# Patient Record
Sex: MALE
[De-identification: names, ages, dates, MRNs, and addresses within clinical notes are randomized per-mention and may not be internally consistent; named-entity substitution may affect disease eponyms.]

---

## 2023-07-26 ENCOUNTER — DASHBOARD ENCOUNTERS (OUTPATIENT)
Age: 33
End: 2023-07-26

## 2023-07-26 ENCOUNTER — OFFICE VISIT (OUTPATIENT)
Dept: URBAN - METROPOLITAN AREA CLINIC 92 | Facility: CLINIC | Age: 33
End: 2023-07-26
Payer: COMMERCIAL

## 2023-07-26 VITALS
BODY MASS INDEX: 21.53 KG/M2 | HEIGHT: 66 IN | WEIGHT: 134 LBS | HEART RATE: 74 BPM | DIASTOLIC BLOOD PRESSURE: 73 MMHG | TEMPERATURE: 97.8 F | SYSTOLIC BLOOD PRESSURE: 113 MMHG

## 2023-07-26 DIAGNOSIS — K62.89 RECTAL DISCOMFORT: ICD-10-CM

## 2023-07-26 DIAGNOSIS — R10.13 EPIGASTRIC ABDOMINAL PAIN: ICD-10-CM

## 2023-07-26 DIAGNOSIS — K59.01 CONSTIPATION: ICD-10-CM

## 2023-07-26 DIAGNOSIS — K21.9 ACID REFLUX: ICD-10-CM

## 2023-07-26 PROBLEM — 14760008: Status: ACTIVE | Noted: 2023-07-26

## 2023-07-26 PROBLEM — 79922009: Status: ACTIVE | Noted: 2023-07-26

## 2023-07-26 PROBLEM — 235595009: Status: ACTIVE | Noted: 2023-07-26

## 2023-07-26 PROCEDURE — 99204 OFFICE O/P NEW MOD 45 MIN: CPT | Performed by: INTERNAL MEDICINE

## 2023-07-26 RX ORDER — OMEPRAZOLE 40 MG/1
1 CAPSULE 30 MINUTES BEFORE MORNING MEAL CAPSULE, DELAYED RELEASE ORAL ONCE A DAY
Qty: 90 | Refills: 3 | OUTPATIENT
Start: 2023-07-26

## 2023-07-26 RX ORDER — ALPRAZOLAM 1 MG/1
1 TABLET TABLET ORAL TWICE A DAY
Status: ACTIVE | COMMUNITY

## 2023-07-26 RX ORDER — ELVITEGRAVIR, COBICISTAT, EMTRICITABINE, AND TENOFOVIR ALAFENAMIDE 150; 150; 200; 10 MG/1; MG/1; MG/1; MG/1
AS DIRECTED TABLET ORAL
Status: ACTIVE | COMMUNITY

## 2023-07-26 NOTE — HPI-TODAY'S VISIT:
Patient is a 33 year old female who presents for abdominal pain and GERD.  The patient reports he developed rectal discomfort 5 days ago followed by lower abdominal pain 3 days ago. Patient notes he went to the urgent care yesterday due to his pain and was prescribed lactulose and a CT scan was ordered, pt still waiting to hear back.  Pain started in his LLQ and moved to the RLQ Pain which made it difficult to sleep but patient reports symptoms have improved. Never had this pain before. Still notes of mild rectal discomfort, no sharp shooting pain. Typically, patient can go 2-3 days without a BM and admits to incomplete BM. Patient notes he was able to have a BM after taking lactulose but was pellet like, has only taken one dose. Denies hematochezia, melena, diarrhea. Patient admits to family history of colon cancer via paternal grandfather. Paternal aunt had history of pancreatic cancer. In 2016, patient had an EGD due to terrible reflux. Procedure revealed a HH and has been taking omeprazole OTC since then with benefit. Avoids sauces, sodas, and other triggering foods. Patient sleeps with head elevated but is gradually able to sleep less inclined without issues. Occasionally, has epigastric pain even on omeprazole and admits to acid reflux if he misses a dose.  Patient has blood work done every 3 months. HIV levels are undetectable. Currenty, on Genvoya.

## 2023-07-26 NOTE — PHYSICAL EXAM GASTROINTESTINAL
Abdomen , soft, nontender, nondistended , no guarding or rigidity , no masses palpable , normal bowel sounds Liver and Spleen , no hepatosplenomegaly Rectal Exam: No external abnormalities, mild discomfort posteriorly, no IH, no blood on examining finger, no fissure visualized. Chaperone present

## 2023-08-14 ENCOUNTER — WEB ENCOUNTER (OUTPATIENT)
Dept: URBAN - METROPOLITAN AREA CLINIC 92 | Facility: CLINIC | Age: 33
End: 2023-08-14

## 2023-08-16 ENCOUNTER — WEB ENCOUNTER (OUTPATIENT)
Dept: URBAN - METROPOLITAN AREA CLINIC 92 | Facility: CLINIC | Age: 33
End: 2023-08-16

## 2023-08-18 ENCOUNTER — OFFICE VISIT (OUTPATIENT)
Dept: URBAN - METROPOLITAN AREA SURGERY CENTER 16 | Facility: SURGERY CENTER | Age: 33
End: 2023-08-18

## 2023-09-14 ENCOUNTER — OFFICE VISIT (OUTPATIENT)
Dept: URBAN - METROPOLITAN AREA CLINIC 92 | Facility: CLINIC | Age: 33
End: 2023-09-14

## 2023-09-14 NOTE — HPI-TODAY'S VISIT:
reece is a 33 year old female who presents for abdominal pain and GERD.        The patient reports he developed rectal discomfort 5 days ago followed by lower abdominal pain 3 days ago. Patient notes he went to the urgent care yesterday due to his pain and was prescribed lactulose and a CT scan was ordered, pt still waiting to hear back. Pain started in his LLQ and moved to the RLQ Pain which made it difficult to sleep but patient reports symptoms have improved. Never had this pain before. Still notes of mild rectal discomfort, no sharp shooting pain. Typically, patient can go 2-3 days without a BM and admits to incomplete BM. Patient notes he was able to have a BM after taking lactulose but was pellet like, has only taken one dose. Denies hematochezia, melena, diarrhea. Patient admits to family history of colon cancer via paternal grandfather. Paternal aunt had history of pancreatic cancer.        In 2016, patient had an EGD due to terrible reflux. Procedure revealed a HH and has been taking omeprazole OTC since then with benefit. Avoids sauces, sodas, and other triggering foods. Patient sleeps with head elevated but is gradually able to sleep less inclined without issues. Occasionally, has epigastric pain even on omeprazole and admits to acid reflux if he misses a dose.        Patient has blood work done every 3 months. HIV levels are undetectable. Currenty, on Genvoya.

## 2024-08-05 ENCOUNTER — WEB ENCOUNTER (OUTPATIENT)
Dept: URBAN - METROPOLITAN AREA CLINIC 15 | Facility: CLINIC | Age: 34
End: 2024-08-05

## 2024-08-05 RX ORDER — OMEPRAZOLE 40 MG/1
1 CAPSULE 30 MINUTES BEFORE MORNING MEAL CAPSULE, DELAYED RELEASE ORAL ONCE A DAY
Qty: 30 | Refills: 0
Start: 2023-07-26

## 2024-08-08 ENCOUNTER — P2P PATIENT RECORD (OUTPATIENT)
Age: 34
End: 2024-08-08

## 2024-08-26 ENCOUNTER — OFFICE VISIT (OUTPATIENT)
Dept: URBAN - METROPOLITAN AREA CLINIC 92 | Facility: CLINIC | Age: 34
End: 2024-08-26

## 2024-08-26 RX ORDER — ELVITEGRAVIR, COBICISTAT, EMTRICITABINE, AND TENOFOVIR ALAFENAMIDE 150; 150; 200; 10 MG/1; MG/1; MG/1; MG/1
AS DIRECTED TABLET ORAL
COMMUNITY

## 2024-08-26 RX ORDER — ALPRAZOLAM 1 MG/1
1 TABLET TABLET ORAL TWICE A DAY
COMMUNITY

## 2024-08-26 RX ORDER — OMEPRAZOLE 40 MG/1
1 CAPSULE 30 MINUTES BEFORE MORNING MEAL CAPSULE, DELAYED RELEASE ORAL ONCE A DAY
Qty: 30 | Refills: 0 | COMMUNITY
Start: 2023-07-26

## 2024-08-26 NOTE — HPI-TODAY'S VISIT:
Patient is a 33 year old male who presents for abdominal pain and GERD.         The patient reports he developed rectal discomfort 5 days ago followed by lower abdominal pain 3 days ago. Patient notes he went to the urgent care yesterday due to his pain and was prescribed lactulose and a CT scan was ordered, pt still waiting to hear back. Pain started in his LLQ and moved to the RLQ Pain which made it difficult to sleep but patient reports symptoms have improved. Never had this pain before. Still notes of mild rectal discomfort, no sharp shooting pain. Typically, patient can go 2-3 days without a BM and admits to incomplete BM. Patient notes he was able to have a BM after taking lactulose but was pellet like, has only taken one dose. Denies hematochezia, melena, diarrhea. Patient admits to family history of colon cancer via paternal grandfather. Paternal aunt had history of pancreatic cancer.        In 2016, patient had an EGD due to terrible reflux. Procedure revealed a HH and has been taking omeprazole OTC since then with benefit. Avoids sauces, sodas, and other triggering foods. Patient sleeps with head elevated but is gradually able to sleep less inclined without issues. Occasionally, has epigastric pain even on omeprazole and admits to acid reflux if he misses a dose.        Patient has blood work done every 3 months. HIV levels are undetectable. Currenty, on Genvoya.

## 2024-09-09 ENCOUNTER — OFFICE VISIT (OUTPATIENT)
Dept: URBAN - METROPOLITAN AREA CLINIC 92 | Facility: CLINIC | Age: 34
End: 2024-09-09

## 2024-09-09 RX ORDER — ALPRAZOLAM 1 MG/1
1 TABLET TABLET ORAL TWICE A DAY
COMMUNITY

## 2024-09-09 RX ORDER — OMEPRAZOLE 40 MG/1
1 CAPSULE 30 MINUTES BEFORE MORNING MEAL CAPSULE, DELAYED RELEASE ORAL ONCE A DAY
Qty: 30 | Refills: 0 | COMMUNITY
Start: 2023-07-26

## 2024-09-09 RX ORDER — ELVITEGRAVIR, COBICISTAT, EMTRICITABINE, AND TENOFOVIR ALAFENAMIDE 150; 150; 200; 10 MG/1; MG/1; MG/1; MG/1
AS DIRECTED TABLET ORAL
COMMUNITY

## 2024-09-16 ENCOUNTER — OFFICE VISIT (OUTPATIENT)
Dept: URBAN - METROPOLITAN AREA CLINIC 92 | Facility: CLINIC | Age: 34
End: 2024-09-16
Payer: COMMERCIAL

## 2024-09-16 VITALS
DIASTOLIC BLOOD PRESSURE: 85 MMHG | WEIGHT: 127 LBS | HEIGHT: 66 IN | BODY MASS INDEX: 20.41 KG/M2 | SYSTOLIC BLOOD PRESSURE: 122 MMHG | TEMPERATURE: 96.9 F | HEART RATE: 65 BPM

## 2024-09-16 DIAGNOSIS — R11.0 NAUSEA: ICD-10-CM

## 2024-09-16 DIAGNOSIS — K21.9 GASTROESOPHAGEAL REFLUX DISEASE, UNSPECIFIED WHETHER ESOPHAGITIS PRESENT: ICD-10-CM

## 2024-09-16 DIAGNOSIS — K59.09 CHANGE IN BOWEL MOVEMENTS INTERMITTENT CONSTIPATION. URGENCY IN THE MORNING.: ICD-10-CM

## 2024-09-16 PROCEDURE — 99213 OFFICE O/P EST LOW 20 MIN: CPT

## 2024-09-16 RX ORDER — ALPRAZOLAM 1 MG/1
1 TABLET TABLET ORAL TWICE A DAY
COMMUNITY

## 2024-09-16 RX ORDER — ONDANSETRON 4 MG/1
1 TABLET ON THE TONGUE AND ALLOW TO DISSOLVE TABLET, ORALLY DISINTEGRATING ORAL
Qty: 30 TABLET | Refills: 5 | OUTPATIENT
Start: 2024-09-16

## 2024-09-16 RX ORDER — OMEPRAZOLE 40 MG/1
1 CAPSULE 30 MINUTES BEFORE MORNING MEAL CAPSULE, DELAYED RELEASE ORAL ONCE A DAY
Qty: 30 | Refills: 0 | Status: ACTIVE | COMMUNITY
Start: 2023-07-26

## 2024-09-16 RX ORDER — OMEPRAZOLE 40 MG/1
1 CAPSULE 30 MINUTES BEFORE MORNING MEAL CAPSULE, DELAYED RELEASE ORAL ONCE A DAY
Qty: 90 | Refills: 3 | OUTPATIENT
Start: 2024-09-16

## 2024-09-16 RX ORDER — ELVITEGRAVIR, COBICISTAT, EMTRICITABINE, AND TENOFOVIR ALAFENAMIDE 150; 150; 200; 10 MG/1; MG/1; MG/1; MG/1
AS DIRECTED TABLET ORAL
COMMUNITY

## 2024-09-16 NOTE — HPI-TODAY'S VISIT:
8/2023 Patient is a 33 year old male who presents for abdominal pain and GERD.         The patient reports he developed rectal discomfort 5 days ago followed by lower abdominal pain 3 days ago. Patient notes he went to the urgent care yesterday due to his pain and was prescribed lactulose and a CT scan was ordered, pt still waiting to hear back. Pain started in his LLQ and moved to the RLQ Pain which made it difficult to sleep but patient reports symptoms have improved. Never had this pain before. Still notes of mild rectal discomfort, no sharp shooting pain. Typically, patient can go 2-3 days without a BM and admits to incomplete BM. Patient notes he was able to have a BM after taking lactulose but was pellet like, has only taken one dose. Denies hematochezia, melena, diarrhea. Patient admits to family history of colon cancer via paternal grandfather. Paternal aunt had history of pancreatic cancer.        In 2016, patient had an EGD due to terrible reflux. Procedure revealed a HH and has been taking omeprazole OTC since then with benefit. Avoids sauces, sodas, and other triggering foods. Patient sleeps with head elevated but is gradually able to sleep less inclined without issues. Occasionally, has epigastric pain even on omeprazole and admits to acid reflux if he misses a dose.        Patient has blood work done every 3 months. HIV levels are undetectable. Currenty, on Genvoya.  9/16/24 Now on omeprazole 40mg daily. Sx are controlled. Has occasional nausea with flares. No vomiting. No dysphagia or odynophagia. Uses NSAIDs a few times a month.  Has constipation goes every 2-3 days. No BRBPR or melena.